# Patient Record
Sex: MALE | Race: WHITE | NOT HISPANIC OR LATINO | Employment: FULL TIME | URBAN - METROPOLITAN AREA
[De-identification: names, ages, dates, MRNs, and addresses within clinical notes are randomized per-mention and may not be internally consistent; named-entity substitution may affect disease eponyms.]

---

## 2021-03-05 ENCOUNTER — OFFICE VISIT (OUTPATIENT)
Dept: FAMILY MEDICINE CLINIC | Facility: CLINIC | Age: 26
End: 2021-03-05

## 2021-03-05 VITALS
TEMPERATURE: 98 F | WEIGHT: 201.8 LBS | SYSTOLIC BLOOD PRESSURE: 120 MMHG | HEART RATE: 53 BPM | DIASTOLIC BLOOD PRESSURE: 70 MMHG | RESPIRATION RATE: 20 BRPM | BODY MASS INDEX: 29.89 KG/M2 | OXYGEN SATURATION: 98 % | HEIGHT: 69 IN

## 2021-03-05 DIAGNOSIS — J45.21 MILD INTERMITTENT ASTHMA WITH ACUTE EXACERBATION: Primary | ICD-10-CM

## 2021-03-05 DIAGNOSIS — G47.8 SLEEP AROUSAL DISORDER: ICD-10-CM

## 2021-03-05 DIAGNOSIS — J30.2 SEASONAL ALLERGIES: ICD-10-CM

## 2021-03-05 DIAGNOSIS — Z72.0 SMOKELESS TOBACCO USE: ICD-10-CM

## 2021-03-05 PROCEDURE — 99204 OFFICE O/P NEW MOD 45 MIN: CPT | Performed by: NURSE PRACTITIONER

## 2021-03-05 RX ORDER — ALBUTEROL SULFATE 90 UG/1
2 AEROSOL, METERED RESPIRATORY (INHALATION) EVERY 4 HOURS PRN
Qty: 18 G | Refills: 1 | Status: SHIPPED | OUTPATIENT
Start: 2021-03-05

## 2021-03-05 NOTE — PROGRESS NOTES
"Chief Complaint  NP / establish PCP, sleep issues (pt cannot hear alarm clock, pt sleeps very \"deep\" ), and Asthma (used inhalers and nebulizer as a child and starting w/ symptoms again )    Subjective          Hal Nolasco presents to [facility]  History of Present Illness  Asthma   Wheezing this morning, dry cough and dry throat upon waking   Takes Claritin OTC for seasonal allergies  No longer has Albuterol inhaler  Had asthma as a child, using Nebulizer.  Some SOA with exertion.   Denies acid reflux or smoking, he does dip smokeless tobacco    Sleeping issues  Does not waking up easily, does not hear the alarm.   Over sleeping and late for work because of it. Works second shift at [employer] for the past 5.5 yrs  Goes to sleep at 5 am, tries to wake up by noon but cannot get up. Girl friend is getting ready to have his baby and he needs to be able to wake up to help care for the baby.  father has sleep apnea, girl friend says he snores     Review of Systems   Constitutional: Positive for fatigue. Negative for activity change and appetite change.   HENT: Negative.    Respiratory: Positive for cough, chest tightness, shortness of breath and wheezing.    Cardiovascular: Negative.    Gastrointestinal: Negative.    Endocrine: Negative.    Genitourinary: Negative.    Musculoskeletal: Negative.    Skin: Negative.    Allergic/Immunologic: Positive for environmental allergies.   Neurological: Negative.  Negative for dizziness and light-headedness.   Psychiatric/Behavioral: Positive for sleep disturbance.     Objective   Vital Signs:   /70   Pulse 53   Temp 98 °F (36.7 °C)   Resp 20   Ht 175.3 cm (69\")   Wt 91.5 kg (201 lb 12.8 oz)   SpO2 98%   BMI 29.80 kg/m²     Physical Exam  Vitals signs and nursing note reviewed.   Constitutional:       General: He is not in acute distress.     Appearance: Normal appearance. He is well-developed and normal weight. He is not ill-appearing. "   HENT:      Head: Normocephalic.      Right Ear: Tympanic membrane, ear canal and external ear normal.      Left Ear: Tympanic membrane, ear canal and external ear normal.      Nose: Nose normal. No congestion or rhinorrhea.      Mouth/Throat:      Mouth: Mucous membranes are moist.      Comments: Large tongue and small airway opening  Eyes:      General: Lids are normal.      Conjunctiva/sclera: Conjunctivae normal.      Pupils: Pupils are equal, round, and reactive to light.   Neck:      Musculoskeletal: Normal range of motion and neck supple.   Cardiovascular:      Rate and Rhythm: Normal rate and regular rhythm.      Pulses: Normal pulses.      Heart sounds: Normal heart sounds, S1 normal and S2 normal. No murmur. No friction rub. No gallop.    Pulmonary:      Effort: Pulmonary effort is normal. No respiratory distress.      Breath sounds: Normal breath sounds. No wheezing or rhonchi.   Musculoskeletal: Normal range of motion.   Lymphadenopathy:      Cervical: No cervical adenopathy.   Skin:     General: Skin is warm and dry.      Capillary Refill: Capillary refill takes less than 2 seconds.   Neurological:      Mental Status: He is alert and oriented to person, place, and time.   Psychiatric:         Speech: Speech normal.         Behavior: Behavior normal. Behavior is cooperative.         Thought Content: Thought content normal.         Judgment: Judgment normal.        Result Review :                 Assessment and Plan    Diagnoses and all orders for this visit:    1. Mild intermittent asthma with acute exacerbation (Primary)  -     albuterol sulfate  (90 Base) MCG/ACT inhaler; Inhale 2 puffs Every 4 (Four) Hours As Needed for Wheezing.  Dispense: 18 g; Refill: 1    2. Sleep arousal disorder  -     Ambulatory Referral to Sleep Medicine    3. Seasonal allergies    4. Smokeless tobacco use        Follow Up   Patient was given instructions and counseling regarding his condition or for health maintenance  advice. Please see specific information pulled into the AVS if appropriate.   If wheezing does not improve with Albuterol pt advised to call back will refer to asthma/allergie specialist  Should get a call about sleep medicine appt within the next week

## 2021-04-09 ENCOUNTER — TELEPHONE (OUTPATIENT)
Dept: FAMILY MEDICINE CLINIC | Facility: CLINIC | Age: 26
End: 2021-04-09

## 2021-04-09 NOTE — TELEPHONE ENCOUNTER
Caller: Hal Nolasco    Relationship: Self    Best call back number: [phone]    What is the medical concern/diagnosis:     What specialty or service is being requested: SLEEP MEDICINE        Any additional details: PATIENT CALLED CHECKING THE STATUS OF HIS REFERRAL FOR SLEEP MEDICINE. HE HAS NEVER HEARD ANYTHING MORE ABOUT IT.

## 2021-04-09 NOTE — TELEPHONE ENCOUNTER
Poonam's response:    Spoke w/ pt and let him know they are backed up a bit with scheduling. Someone should contact him within the next week.

## 2021-07-02 ENCOUNTER — OFFICE VISIT (OUTPATIENT)
Dept: SLEEP MEDICINE | Facility: HOSPITAL | Age: 26
End: 2021-07-02

## 2021-07-02 VITALS
BODY MASS INDEX: 30.58 KG/M2 | HEART RATE: 63 BPM | HEIGHT: 70 IN | OXYGEN SATURATION: 96 % | DIASTOLIC BLOOD PRESSURE: 62 MMHG | SYSTOLIC BLOOD PRESSURE: 123 MMHG | WEIGHT: 213.6 LBS

## 2021-07-02 DIAGNOSIS — R06.83 SNORING: Primary | ICD-10-CM

## 2021-07-02 DIAGNOSIS — G47.33 OBSTRUCTIVE SLEEP APNEA, ADULT: ICD-10-CM

## 2021-07-02 DIAGNOSIS — E66.09 CLASS 1 OBESITY DUE TO EXCESS CALORIES WITHOUT SERIOUS COMORBIDITY WITH BODY MASS INDEX (BMI) OF 30.0 TO 30.9 IN ADULT: ICD-10-CM

## 2021-07-02 DIAGNOSIS — G47.10 HYPERSOMNIA: ICD-10-CM

## 2021-07-02 PROCEDURE — 99203 OFFICE O/P NEW LOW 30 MIN: CPT | Performed by: INTERNAL MEDICINE

## 2021-07-02 NOTE — PATIENT INSTRUCTIONS
Hypersomnia  Hypersomnia is a condition in which a person feels very tired during the day even though he or she gets plenty of sleep at night. A person with this condition may take naps during the day and may find it very difficult to wake up from sleep. Hypersomnia may affect a person's ability to think, concentrate, drive, or remember things.  What are the causes?  The cause of this condition may not be known. Possible causes include:  · Certain medicines.  · Sleep disorders, such as narcolepsy and sleep apnea.  · Injury to the head, brain, or spinal cord.  · Drug or alcohol use.  · Gastroesophageal reflux disease (GERD).  · Tumors.  · Certain medical conditions, such as depression, diabetes, or an underactive thyroid gland (hypothyroidism).  What are the signs or symptoms?  The main symptoms of hypersomnia include:  · Feeling very tired throughout the day, regardless of how much sleep you got the night before.  · Having trouble waking up. Others may find it difficult to wake you up when you are sleeping.  · Sleeping for longer and longer periods at a time.  · Taking naps throughout the day.  Other symptoms may include:  · Feeling restless, anxious, or annoyed.  · Lacking energy.  · Having trouble with:  ? Remembering.  ? Speaking.  ? Thinking.  · Loss of appetite.  · Seeing, hearing, tasting, smelling, or feeling things that are not real (hallucinations).  How is this diagnosed?  This condition may be diagnosed based on:  · Your symptoms and medical history.  · Your sleeping habits. Your health care provider may ask you to write down your sleeping habits in a daily sleep log, along with any symptoms you have.  · A series of tests that are done while you sleep (sleep study or polysomnogram).  · A test that measures how quickly you can fall asleep during the day (daytime nap study or multiple sleep latency test).  How is this treated?  Treatment can help you manage your condition. Treatment may  include:  · Following a regular sleep routine.  · Lifestyle changes, such as changing your eating habits, getting regular exercise, and avoiding alcohol or caffeinated beverages.  · Taking medicines to make you more alert (stimulants) during the day.  · Treating any underlying medical causes of hypersomnia.  Follow these instructions at home:  Sleep routine    · Schedule the same bedtime and wake-up time each day.  · Practice a relaxing bedtime routine. This may include reading, meditation, deep breathing, or taking a warm bath before going to sleep.  · Get regular exercise each day. Avoid strenuous exercise in the evening hours.  · Keep your sleep environment at a cooler temperature, darkened, and quiet.  · Sleep with pillows and a mattress that are comfortable and supportive.  · Schedule short 20-minute naps for when you feel sleepiest during the day.  · Talk with your employer or teachers about your hypersomnia. If possible, adjust your schedule so that:  ? You have a regular daytime work schedule.  ? You can take a scheduled nap during the day.  ? You do not have to work or be active at night.  · Do not eat a heavy meal for a few hours before bedtime. Eat your meals at about the same times every day.  · Avoid drinking alcohol or caffeinated beverages.  Safety    · Do not drive or use heavy machinery if you are sleepy. Ask your health care provider if it is safe for you to drive.  · Wear a life jacket when swimming or spending time near water.  General instructions  · Take supplements and over-the-counter and prescription medicines only as told by your health care provider.  · Keep a sleep log that will help your doctor manage your condition. This may include information about:  ? What time you go to bed each night.  ? How often you wake up at night.  ? How many hours you sleep at night.  ? How often and for how long you nap during the day.  ? Any observations from others, such as leg movements during sleep,  sleep walking, or snoring.  · Keep all follow-up visits as told by your health care provider. This is important.  Contact a health care provider if:  · You have new symptoms.  · Your symptoms get worse.  Get help right away if:  · You have serious thoughts about hurting yourself or someone else.  If you ever feel like you may hurt yourself or others, or have thoughts about taking your own life, get help right away. You can go to your nearest emergency department or call:  · Your local emergency services (911 in the [place]  · A suicide crisis helpline, such as the National Suicide Prevention Lifeline at 1-951.523.2343. This is open 24 hours a day.  Summary  · Hypersomnia refers to a condition in which you feel very tired during the day even though you get plenty of sleep at night.  · A person with this condition may take naps during the day and may find it very difficult to wake up from sleep.  · Hypersomnia may affect a person's ability to think, concentrate, drive, or remember things.  · Treatment, such as following a regular sleep routine and making some lifestyle changes, can help you manage your condition.  This information is not intended to replace advice given to you by your health care provider. Make sure you discuss any questions you have with your health care provider.  Document Revised: 12/20/2018 Document Reviewed: 12/20/2018  [employer] Patient Education © 2021 [employer]  Sleep Apnea  Sleep apnea is a condition in which breathing pauses or becomes shallow during sleep. Episodes of sleep apnea usually last 10 seconds or longer, and they may occur as many as 20 times an hour. Sleep apnea disrupts your sleep and keeps your body from getting the rest that it needs. This condition can increase your risk of certain health problems, including:  · Heart attack.  · Stroke.  · Obesity.  · Diabetes.  · Heart failure.  · Irregular heartbeat.  What are the causes?  There are three kinds of sleep  apnea:  · Obstructive sleep apnea. This kind is caused by a blocked or collapsed airway.  · Central sleep apnea. This kind happens when the part of the brain that controls breathing does not send the correct signals to the muscles that control breathing.  · Mixed sleep apnea. This is a combination of obstructive and central sleep apnea.  The most common cause of this condition is a collapsed or blocked airway. An airway can collapse or become blocked if:  · Your throat muscles are abnormally relaxed.  · Your tongue and tonsils are larger than normal.  · You are overweight.  · Your airway is smaller than normal.  What increases the risk?  You are more likely to develop this condition if you:  · Are overweight.  · Smoke.  · Have a smaller than normal airway.  · Are elderly.  · Are male.  · Drink alcohol.  · Take sedatives or tranquilizers.  · Have a family history of sleep apnea.  What are the signs or symptoms?  Symptoms of this condition include:  · Trouble staying asleep.  · Daytime sleepiness and tiredness.  · Irritability.  · Loud snoring.  · Morning headaches.  · Trouble concentrating.  · Forgetfulness.  · Decreased interest in sex.  · Unexplained sleepiness.  · Mood swings.  · Personality changes.  · Feelings of depression.  · Waking up often during the night to urinate.  · Dry mouth.  · Sore throat.  How is this diagnosed?  This condition may be diagnosed with:  · A medical history.  · A physical exam.  · A series of tests that are done while you are sleeping (sleep study). These tests are usually done in a sleep lab, but they may also be done at home.  How is this treated?  Treatment for this condition aims to restore normal breathing and to ease symptoms during sleep. It may involve managing health issues that can affect breathing, such as high blood pressure or obesity. Treatment may include:  · Sleeping on your side.  · Using a decongestant if you have nasal congestion.  · Avoiding the use of depressants,  including alcohol, sedatives, and narcotics.  · Losing weight if you are overweight.  · Making changes to your diet.  · Quitting smoking.  · Using a device to open your airway while you sleep, such as:  ? An oral appliance. This is a custom-made mouthpiece that shifts your lower jaw forward.  ? A continuous positive airway pressure (CPAP) device. This device blows air through a mask when you breathe out (exhale).  ? A nasal expiratory positive airway pressure (EPAP) device. This device has valves that you put into each nostril.  ? A bi-level positive airway pressure (BPAP) device. This device blows air through a mask when you breathe in (inhale) and breathe out (exhale).  · Having surgery if other treatments do not work. During surgery, excess tissue is removed to create a wider airway.  It is important to get treatment for sleep apnea. Without treatment, this condition can lead to:  · High blood pressure.  · Coronary artery disease.  · In men, an inability to achieve or maintain an erection (impotence).  · Reduced thinking abilities.  Follow these instructions at home:  Lifestyle  · Make any lifestyle changes that your health care provider recommends.  · Eat a healthy, well-balanced diet.  · Take steps to lose weight if you are overweight.  · Avoid using depressants, including alcohol, sedatives, and narcotics.  · Do not use any products that contain nicotine or tobacco, such as cigarettes, e-cigarettes, and chewing tobacco. If you need help quitting, ask your health care provider.  General instructions  · Take over-the-counter and prescription medicines only as told by your health care provider.  · If you were given a device to open your airway while you sleep, use it only as told by your health care provider.  · If you are having surgery, make sure to tell your health care provider you have sleep apnea. You may need to bring your device with you.  · Keep all follow-up visits as told by your health care provider.  This is important.  Contact a health care provider if:  · The device that you received to open your airway during sleep is uncomfortable or does not seem to be working.  · Your symptoms do not improve.  · Your symptoms get worse.  Get help right away if:  · You develop:  ? Chest pain.  ? Shortness of breath.  ? Discomfort in your back, arms, or stomach.  · You have:  ? Trouble speaking.  ? Weakness on one side of your body.  ? Drooping in your face.  These symptoms may represent a serious problem that is an emergency. Do not wait to see if the symptoms will go away. Get medical help right away. Call your local emergency services (911 in the [place] Do not drive yourself to the hospital.  Summary  · Sleep apnea is a condition in which breathing pauses or becomes shallow during sleep.  · The most common cause is a collapsed or blocked airway.  · The goal of treatment is to restore normal breathing and to ease symptoms during sleep.  This information is not intended to replace advice given to you by your health care provider. Make sure you discuss any questions you have with your health care provider.  Document Revised: 06/03/2020 Document Reviewed: 08/13/2019  [employer] Patient Education © 2021 [employer]

## 2021-07-02 NOTE — PROGRESS NOTES
Subjective   Hal Nolasco is a 25 y.o. male is being seen for consultation today at the request of ELGIN Haney for the evaluation of excessive daytime sleepiness.  He is seen in person in the sleep clinic.    History of Present Illness  Patient complains of having difficulty waking up in the morning.  He says he sets up to 3 loud alarms but still sometimes sleeps through them.  He says he has difficulty even if others are shaking him to wake up.  He says he has been sleepy since childhood.  He would fall asleep in class when he was in school.  He has been having problems getting to work on time because he has been sleeping late.  He did denies any family history of excessive daytime sleepiness.  He denies any history of hypnagogic hallucinations sleep paralysis or cataplexy.    He has been noted to have snoring for at least 5 years.  Apparently is worse when he is on his back.  He has had occasional apneas noted.  He denies awakening gasping for breath.  He admits that he is not rested on arising in the morning.  He denies having morning headaches.  He will fall asleep if sitting quietly during the day.  He denies problems while driving.    He will awaken with a sore throat.  He denies ever breaking his nose.  He denies any recent weight change.  He does have a lot of nasal allergies.    He goes to bed between 5 AM and 6 AM.  He will fall asleep in 15 minutes.  He does not think he awakens during his sleep time.  He gets at least 10 hours of sleep but still does not feel rested.  He denies any history of hypertension diabetes coronary artery disease.  Allergies   Allergen Reactions   • Shellfish-Derived Products Unknown - High Severity          Current Outpatient Medications:   •  albuterol sulfate  (90 Base) MCG/ACT inhaler, Inhale 2 puffs Every 4 (Four) Hours As Needed for Wheezing., Disp: 18 g, Rfl: 1    Social History    Tobacco Use      Smoking status: Never Smoker      Smokeless tobacco: Current  "User he estimates using a half a can of snuff each day        Types: Snuff       Social History     Substance and Sexual Activity   Alcohol Use Yes    Comment: 7-9 DRINKS MONTHLY OR LESS       Caffeine: He has 1 serving of tea and 2-3 servings of cola each day    Past Medical History:   Diagnosis Date   • Asthma        Past Surgical History:   Procedure Laterality Date   • CIRCUMCISION      X2   • WISDOM TOOTH EXTRACTION         Family History   Problem Relation Age of Onset   • Stroke Other    • Cancer Other    • Asthma Other        The following portions of the patient's history were reviewed and updated as appropriate: allergies, current medications, past family history, past medical history, past social history, past surgical history and problem list.    Review of Systems   Constitutional: Positive for fatigue.   HENT: Positive for congestion, sinus pressure and sore throat.    Eyes: Negative.    Respiratory: Positive for wheezing.    Cardiovascular: Negative.    Gastrointestinal: Negative.    Endocrine: Negative.    Genitourinary: Negative.    Musculoskeletal: Positive for back pain.   Skin: Negative.    Allergic/Immunologic: Positive for environmental allergies and food allergies.   Neurological: Negative.    Hematological: Negative.    Psychiatric/Behavioral: Negative.    [place] score is 12/24    Objective     /62 (BP Location: Left arm, Patient Position: Sitting, Cuff Size: Adult)   Pulse 63   Ht 177.8 cm (70\")   Wt 96.9 kg (213 lb 9.6 oz)   SpO2 96%   BMI 30.65 kg/m²      Physical Exam  Patient appears to be awake and alert.  He is not appear to be in acute respiratory distress.    He is normocephalic.  He has Mallampati class I anatomy.    Lungs are clear.    Cardiac exam revealed normal S1-S2    Extremities showed no edema.    Assessment/Plan   Diagnoses and all orders for this visit:    1. Snoring (Primary)  -     Home Sleep Study; Future    2. Obstructive sleep apnea, adult  -     Home Sleep " Study; Future    3. Hypersomnia    4. Class 1 obesity due to excess calories without serious comorbidity with body mass index (BMI) of 30.0 to 30.9 in adult    Patient has a history of hypersomnia and is very difficult to arouse in the morning.  He also however has a history of snoring and nonrestorative sleep.  I think he has a fairly good story for obstructive sleep apnea.  We will plan to proceed to home sleep testing.  We have discussed possible therapies including CPAP, weight control, oral appliance, and surgery.  We have also discussed the long-term consequences of untreated obstructive sleep apnea.  These include hypertension, diabetes, heart disease, stroke, and dementia.  He is encouraged to achieve ideal body weight.  He is encouraged to avoid alcohol and sedatives close to bedtime.  He is encouraged to practice lateral position sleep.    He has significant hypersomnia but denies any hypnagogic hallucinations sleep paralysis or cataplexy.  If his work-up for sleep apnea is negative or if he remains sleepy even after being treated for obstructive sleep apnea would consider an overnight polysomnogram and possible MSLT to evaluate for possible narcolepsy.    Total time: 35 minutes exclusive of procedures.         Rafiq Montoya MD [facility]  Sleep Medicine  Pulmonary and Critical Care Medicine

## 2021-09-01 ENCOUNTER — HOSPITAL ENCOUNTER (OUTPATIENT)
Dept: SLEEP MEDICINE | Facility: HOSPITAL | Age: 26
Discharge: HOME | End: 2021-09-01
Admitting: INTERNAL MEDICINE

## 2021-09-01 VITALS — HEIGHT: 70 IN | WEIGHT: 213.63 LBS | BODY MASS INDEX: 30.58 KG/M2

## 2021-09-01 DIAGNOSIS — G47.33 OBSTRUCTIVE SLEEP APNEA, ADULT: ICD-10-CM

## 2021-09-01 DIAGNOSIS — R06.83 SNORING: ICD-10-CM

## 2021-09-01 PROCEDURE — 95800 SLP STDY UNATTENDED: CPT

## 2021-09-01 PROCEDURE — 95800 SLP STDY UNATTENDED: CPT | Performed by: INTERNAL MEDICINE

## 2021-09-02 DIAGNOSIS — G47.33 OBSTRUCTIVE SLEEP APNEA, ADULT: Primary | ICD-10-CM

## 2021-09-02 DIAGNOSIS — R06.83 SNORING: ICD-10-CM

## 2021-09-02 DIAGNOSIS — G47.10 HYPERSOMNIA: ICD-10-CM

## 2021-09-03 NOTE — PROGRESS NOTES
CALLED PATIENT TO ADVISE OF STUDY. REACHED MESSAGE STATING VM HAS NOT BEEN SET UP' UNABLE TO LEAVE MESSAGE 9/03/21 [facility]

## 2021-09-08 ENCOUNTER — TELEPHONE (OUTPATIENT)
Dept: SLEEP MEDICINE | Facility: HOSPITAL | Age: 26
End: 2021-09-08

## 2022-01-20 PROCEDURE — U0004 COV-19 TEST NON-CDC HGH THRU: HCPCS | Performed by: PHYSICIAN ASSISTANT
